# Patient Record
Sex: MALE | Race: WHITE | NOT HISPANIC OR LATINO | URBAN - METROPOLITAN AREA
[De-identification: names, ages, dates, MRNs, and addresses within clinical notes are randomized per-mention and may not be internally consistent; named-entity substitution may affect disease eponyms.]

---

## 2019-02-06 ENCOUNTER — OFFICE VISIT (OUTPATIENT)
Dept: OPHTHALMOLOGY | Facility: CLINIC | Age: 49
End: 2019-02-06
Payer: COMMERCIAL

## 2019-02-06 DIAGNOSIS — H52.7 REFRACTIVE ERROR: ICD-10-CM

## 2019-02-06 DIAGNOSIS — H53.8 BLURRY VISION, BILATERAL: Primary | ICD-10-CM

## 2019-02-06 PROCEDURE — 92004 COMPRE OPH EXAM NEW PT 1/>: CPT | Mod: S$GLB,,, | Performed by: OPHTHALMOLOGY

## 2019-02-06 PROCEDURE — 92004 PR EYE EXAM, NEW PATIENT,COMPREHESV: ICD-10-PCS | Mod: S$GLB,,, | Performed by: OPHTHALMOLOGY

## 2019-02-06 PROCEDURE — 99999 PR PBB SHADOW E&M-NEW PATIENT-LVL III: CPT | Mod: PBBFAC,,, | Performed by: OPHTHALMOLOGY

## 2019-02-06 PROCEDURE — 99999 PR PBB SHADOW E&M-NEW PATIENT-LVL III: ICD-10-PCS | Mod: PBBFAC,,, | Performed by: OPHTHALMOLOGY

## 2019-02-06 PROCEDURE — 92015 DETERMINE REFRACTIVE STATE: CPT | Mod: S$GLB,,, | Performed by: OPHTHALMOLOGY

## 2019-02-06 PROCEDURE — 92015 PR REFRACTION: ICD-10-PCS | Mod: S$GLB,,, | Performed by: OPHTHALMOLOGY

## 2019-02-06 NOTE — PATIENT INSTRUCTIONS
Understanding Focusing Problems  Your vision depends on how light is focused in your eye. An eye has different parts to it, such as the cornea, lens, and iris. These parts work together to refract, or bend, and focus light rays. With normal vision, light is focused on the back of the eyeball. This area is called the retina. But in some cases, all or part of the eye is not the right shape. This causes light to focus in the wrong place. It makes vision blurry. Three common problems with focusing include hyperopia, myopia, and astigmatism.        Emmetropia       Hyperopia      Emmetropia or normal vision happens when light focuses on the retina.  Hyperopia or farsightedness occurs when light focuses behind the retina. Nearby objects look blurry.     Myopia       Astigmatism      Myopia or nearsightedness occurs when light focuses in front of the retina. Distant objects look blurry.  Astigmatism occurs when light focuses in more than one place. Both nearby and distant objects can look blurry.  If youre over 40  With age, the lens becomes stiff. Stiffening of the lens makes it more difficult for the lens to accommodate, or change shape, to focus light. This leads to presbyopia, or trouble focusing on nearby objects.   Date Last Reviewed: 8/16/2015  © 4917-0328 [employer]. [address] [place] [address], [place] [address]. All rights reserved. This information is not intended as a substitute for professional medical care. Always follow your healthcare professional's instructions.

## 2019-02-06 NOTE — PROGRESS NOTES
HPI     no blurred va// no flashes// no floaters//  Does get starbursts with car   lights in the PM//    Last edited by Rowan Li on 2/6/2019  1:09 PM. (History)        ROS     Negative for: Constitutional, Gastrointestinal, Neurological, Skin,   Genitourinary, Musculoskeletal, HENT, Endocrine, Cardiovascular, Eyes,   Respiratory, Psychiatric, Allergic/Imm, Heme/Lymph    Last edited by Skyler Low Jr., MD on 2/6/2019  1:53 PM. (History)        Assessment /Plan     For exam results, see Encounter Report.    Blurry vision, bilateral    Refractive error  Current glasses are scratched up, no acute or chronic ocular pathology  Rx for glasses given  Follow-up in about 1 year (around 2/6/2020) for Annual w/ optometry.

## 2021-03-10 ENCOUNTER — IMMUNIZATION (OUTPATIENT)
Dept: PRIMARY CARE CLINIC | Facility: CLINIC | Age: 51
End: 2021-03-10
Payer: COMMERCIAL

## 2021-03-10 DIAGNOSIS — Z23 NEED FOR VACCINATION: Primary | ICD-10-CM

## 2021-03-10 PROCEDURE — 0031A PR IMMUNIZ ADMIN, SARS-COV-2 COVID-19 VACC, 5X10VP/0.5ML: CPT | Mod: CV19,S$GLB,, | Performed by: INTERNAL MEDICINE

## 2021-03-10 PROCEDURE — 91303 PR SARSCOV2 VAC AD26 .5ML IM: ICD-10-PCS | Mod: S$GLB,,, | Performed by: INTERNAL MEDICINE

## 2021-03-10 PROCEDURE — 0031A PR IMMUNIZ ADMIN, SARS-COV-2 COVID-19 VACC, 5X10VP/0.5ML: ICD-10-PCS | Mod: CV19,S$GLB,, | Performed by: INTERNAL MEDICINE

## 2021-03-10 PROCEDURE — 91303 PR SARSCOV2 VAC AD26 .5ML IM: CPT | Mod: S$GLB,,, | Performed by: INTERNAL MEDICINE

## 2021-12-10 ENCOUNTER — IMMUNIZATION (OUTPATIENT)
Dept: FAMILY MEDICINE | Facility: CLINIC | Age: 51
End: 2021-12-10
Payer: COMMERCIAL

## 2021-12-10 DIAGNOSIS — Z23 NEED FOR VACCINATION: Primary | ICD-10-CM

## 2021-12-10 PROCEDURE — 0004A COVID-19, MRNA, LNP-S, PF, 30 MCG/0.3 ML DOSE VACCINE: CPT | Mod: PBBFAC | Performed by: FAMILY MEDICINE

## 2023-08-08 ENCOUNTER — OFFICE VISIT (OUTPATIENT)
Dept: FAMILY MEDICINE | Facility: CLINIC | Age: 53
End: 2023-08-08
Payer: COMMERCIAL

## 2023-08-08 VITALS
SYSTOLIC BLOOD PRESSURE: 138 MMHG | WEIGHT: 181.19 LBS | HEIGHT: 73 IN | HEART RATE: 64 BPM | OXYGEN SATURATION: 99 % | DIASTOLIC BLOOD PRESSURE: 80 MMHG | BODY MASS INDEX: 24.01 KG/M2

## 2023-08-08 DIAGNOSIS — Z12.11 COLON CANCER SCREENING: ICD-10-CM

## 2023-08-08 DIAGNOSIS — J45.909 EXTRINSIC ASTHMA, UNSPECIFIED ASTHMA SEVERITY, UNSPECIFIED WHETHER COMPLICATED, UNSPECIFIED WHETHER PERSISTENT: ICD-10-CM

## 2023-08-08 DIAGNOSIS — Z00.00 PREVENTATIVE HEALTH CARE: Primary | ICD-10-CM

## 2023-08-08 PROCEDURE — 3008F PR BODY MASS INDEX (BMI) DOCUMENTED: ICD-10-PCS | Mod: CPTII,S$GLB,, | Performed by: FAMILY MEDICINE

## 2023-08-08 PROCEDURE — 3008F BODY MASS INDEX DOCD: CPT | Mod: CPTII,S$GLB,, | Performed by: FAMILY MEDICINE

## 2023-08-08 PROCEDURE — 3075F SYST BP GE 130 - 139MM HG: CPT | Mod: CPTII,S$GLB,, | Performed by: FAMILY MEDICINE

## 2023-08-08 PROCEDURE — 3075F PR MOST RECENT SYSTOLIC BLOOD PRESS GE 130-139MM HG: ICD-10-PCS | Mod: CPTII,S$GLB,, | Performed by: FAMILY MEDICINE

## 2023-08-08 PROCEDURE — 99386 PREV VISIT NEW AGE 40-64: CPT | Mod: S$GLB,,, | Performed by: FAMILY MEDICINE

## 2023-08-08 PROCEDURE — 1159F PR MEDICATION LIST DOCUMENTED IN MEDICAL RECORD: ICD-10-PCS | Mod: CPTII,S$GLB,, | Performed by: FAMILY MEDICINE

## 2023-08-08 PROCEDURE — 3079F PR MOST RECENT DIASTOLIC BLOOD PRESSURE 80-89 MM HG: ICD-10-PCS | Mod: CPTII,S$GLB,, | Performed by: FAMILY MEDICINE

## 2023-08-08 PROCEDURE — 3079F DIAST BP 80-89 MM HG: CPT | Mod: CPTII,S$GLB,, | Performed by: FAMILY MEDICINE

## 2023-08-08 PROCEDURE — 1159F MED LIST DOCD IN RCRD: CPT | Mod: CPTII,S$GLB,, | Performed by: FAMILY MEDICINE

## 2023-08-08 PROCEDURE — 99999 PR PBB SHADOW E&M-EST. PATIENT-LVL III: CPT | Mod: PBBFAC,,, | Performed by: FAMILY MEDICINE

## 2023-08-08 PROCEDURE — 99386 PR PREVENTIVE VISIT,NEW,40-64: ICD-10-PCS | Mod: S$GLB,,, | Performed by: FAMILY MEDICINE

## 2023-08-08 PROCEDURE — 99999 PR PBB SHADOW E&M-EST. PATIENT-LVL III: ICD-10-PCS | Mod: PBBFAC,,, | Performed by: FAMILY MEDICINE

## 2023-08-08 NOTE — PROGRESS NOTES
Subjective:       Patient ID: Javid Tai is a 53 y.o. male.    Chief Complaint: Annual Exam    Here to establish care and for general exam    Palpitations, skipping beats - Previous cardiac exam in 2013 was normal, including holter.  H/o elevated blood pressure - following low-salt diet.    Asthma - C/o some chest tightness at night that are relieved with relaxation techniques. No exercise symptoms. He did not respond to albuterol or inhaled steroids.    Enjoys tennis.    Some intermittent LLQ pains that he thinks may be diverticulitis    Taking calicium + D, magnesium and B12.    He has had covid on 4 occasions, including the Delta variant.        Past Medical History:   Diagnosis Date    Non-celiac gluten sensitivity     Paroxysmal A-fib     primarily with alcohol trigger    Shingles        Past Surgical History:   Procedure Laterality Date    WISDOM TOOTH EXTRACTION         Review of patient's allergies indicates:   Allergen Reactions    Gluten protein Other (See Comments) and Shortness Of Breath       Social History     Socioeconomic History    Marital status:     Number of children: 1   Occupational History    Occupation:    Tobacco Use    Smoking status: Never    Smokeless tobacco: Former   Substance and Sexual Activity    Alcohol use: Yes    Drug use: No   Social History Narrative    Enjoys tennis       No current outpatient medications on file prior to visit.     No current facility-administered medications on file prior to visit.       Family History   Problem Relation Age of Onset    Hypertension Mother     Fibromyalgia Mother     Amblyopia Neg Hx     Blindness Neg Hx     Cancer Neg Hx     Cataracts Neg Hx     Diabetes Neg Hx     Glaucoma Neg Hx     Macular degeneration Neg Hx     Retinal detachment Neg Hx     Strabismus Neg Hx     Stroke Neg Hx     Thyroid disease Neg Hx        Review of Systems   Constitutional:  Negative for appetite change, chills, fever and unexpected weight change.  "  HENT:  Negative for sore throat and trouble swallowing.    Eyes:  Negative for pain and visual disturbance.   Respiratory:  Positive for chest tightness. Negative for cough, shortness of breath and wheezing.    Cardiovascular:  Negative for chest pain, palpitations and leg swelling.   Gastrointestinal:  Negative for abdominal pain, blood in stool, constipation, diarrhea and nausea.   Genitourinary:  Negative for difficulty urinating, dysuria and hematuria.   Musculoskeletal:  Negative for arthralgias, gait problem and neck pain.   Skin:  Negative for rash and wound.   Neurological:  Negative for dizziness, weakness, light-headedness and headaches.   Hematological:  Negative for adenopathy.   Psychiatric/Behavioral:  Negative for dysphoric mood.        Objective:      /80   Pulse 64   Ht 6' 1.2" (1.859 m)   Wt 82.2 kg (181 lb 3.5 oz)   SpO2 99%   BMI 23.78 kg/m²   Physical Exam  Constitutional:       General: He is not in acute distress.     Appearance: He is well-developed.   HENT:      Head: Normocephalic and atraumatic.      Right Ear: External ear normal.      Left Ear: External ear normal.      Mouth/Throat:      Pharynx: Uvula midline. No oropharyngeal exudate.   Eyes:      General: Lids are normal.      Conjunctiva/sclera: Conjunctivae normal.      Pupils: Pupils are equal, round, and reactive to light.   Neck:      Thyroid: No thyroid mass or thyromegaly.      Trachea: Phonation normal.   Cardiovascular:      Rate and Rhythm: Normal rate and regular rhythm.      Heart sounds: Normal heart sounds. No murmur heard.     No friction rub. No gallop.   Pulmonary:      Effort: Pulmonary effort is normal. No respiratory distress.      Breath sounds: Normal breath sounds. No wheezing or rales.   Musculoskeletal:         General: Normal range of motion.      Cervical back: Full passive range of motion without pain, normal range of motion and neck supple.   Lymphadenopathy:      Cervical: No cervical " adenopathy.   Skin:     General: Skin is warm and dry.   Neurological:      Mental Status: He is alert and oriented to person, place, and time.      Cranial Nerves: No cranial nerve deficit.      Coordination: Coordination normal.   Psychiatric:         Speech: Speech normal.         Behavior: Behavior normal.         Thought Content: Thought content normal.         Judgment: Judgment normal.         Assessment:       1. Preventative health care    2. Colon cancer screening    3. Extrinsic asthma, unspecified asthma severity, unspecified whether complicated, unspecified whether persistent        Plan:       Preventative health care  -     Comprehensive Metabolic Panel; Future; Expected date: 08/08/2023  -     CBC Auto Differential; Future; Expected date: 08/08/2023  -     Lipid Panel; Future; Expected date: 08/08/2023  -     PSA, Screening; Future; Expected date: 08/08/2023  -     Hepatitis C Antibody; Future; Expected date: 08/08/2023  -     HIV 1/2 Ag/Ab (4th Gen); Future; Expected date: 08/08/2023    Colon cancer screening  -     Case Request Endoscopy: COLONOSCOPY    Extrinsic asthma, unspecified asthma severity, unspecified whether complicated, unspecified whether persistent  -     Ambulatory referral/consult to Allergy; Future; Expected date: 08/15/2023      Overall appears healthy  Allergist referral to consider allergy testing for possible nightime asthma triggers (dust mites)  Counseled on regular exercise, maintenance of a healthy weight, balanced diet rich in fruits/vegetables and lean protein, and avoidance of unhealthy habits like smoking and excessive alcohol intake.

## 2023-08-09 ENCOUNTER — LAB VISIT (OUTPATIENT)
Dept: LAB | Facility: HOSPITAL | Age: 53
End: 2023-08-09
Attending: FAMILY MEDICINE
Payer: COMMERCIAL

## 2023-08-09 DIAGNOSIS — Z00.00 PREVENTATIVE HEALTH CARE: ICD-10-CM

## 2023-08-09 LAB
ALBUMIN SERPL BCP-MCNC: 4.1 G/DL (ref 3.5–5.2)
ALP SERPL-CCNC: 58 U/L (ref 55–135)
ALT SERPL W/O P-5'-P-CCNC: 32 U/L (ref 10–44)
ANION GAP SERPL CALC-SCNC: 9 MMOL/L (ref 8–16)
AST SERPL-CCNC: 35 U/L (ref 10–40)
BASOPHILS # BLD AUTO: 0.06 K/UL (ref 0–0.2)
BASOPHILS NFR BLD: 1.3 % (ref 0–1.9)
BILIRUB SERPL-MCNC: 0.7 MG/DL (ref 0.1–1)
BUN SERPL-MCNC: 23 MG/DL (ref 6–20)
CALCIUM SERPL-MCNC: 10 MG/DL (ref 8.7–10.5)
CHLORIDE SERPL-SCNC: 102 MMOL/L (ref 95–110)
CHOLEST SERPL-MCNC: 196 MG/DL (ref 120–199)
CHOLEST/HDLC SERPL: 3.2 {RATIO} (ref 2–5)
CO2 SERPL-SCNC: 28 MMOL/L (ref 23–29)
COMPLEXED PSA SERPL-MCNC: 0.69 NG/ML (ref 0–4)
CREAT SERPL-MCNC: 1.2 MG/DL (ref 0.5–1.4)
DIFFERENTIAL METHOD: NORMAL
EOSINOPHIL # BLD AUTO: 0.1 K/UL (ref 0–0.5)
EOSINOPHIL NFR BLD: 2.4 % (ref 0–8)
ERYTHROCYTE [DISTWIDTH] IN BLOOD BY AUTOMATED COUNT: 13.2 % (ref 11.5–14.5)
EST. GFR  (NO RACE VARIABLE): >60 ML/MIN/1.73 M^2
GLUCOSE SERPL-MCNC: 93 MG/DL (ref 70–110)
HCT VFR BLD AUTO: 43.1 % (ref 40–54)
HCV AB SERPL QL IA: NORMAL
HDLC SERPL-MCNC: 62 MG/DL (ref 40–75)
HDLC SERPL: 31.6 % (ref 20–50)
HGB BLD-MCNC: 14.2 G/DL (ref 14–18)
HIV 1+2 AB+HIV1 P24 AG SERPL QL IA: NORMAL
IMM GRANULOCYTES # BLD AUTO: 0.01 K/UL (ref 0–0.04)
IMM GRANULOCYTES NFR BLD AUTO: 0.2 % (ref 0–0.5)
LDLC SERPL CALC-MCNC: 121.6 MG/DL (ref 63–159)
LYMPHOCYTES # BLD AUTO: 1.3 K/UL (ref 1–4.8)
LYMPHOCYTES NFR BLD: 29.2 % (ref 18–48)
MCH RBC QN AUTO: 30.3 PG (ref 27–31)
MCHC RBC AUTO-ENTMCNC: 32.9 G/DL (ref 32–36)
MCV RBC AUTO: 92 FL (ref 82–98)
MONOCYTES # BLD AUTO: 0.4 K/UL (ref 0.3–1)
MONOCYTES NFR BLD: 9.7 % (ref 4–15)
NEUTROPHILS # BLD AUTO: 2.6 K/UL (ref 1.8–7.7)
NEUTROPHILS NFR BLD: 57.2 % (ref 38–73)
NONHDLC SERPL-MCNC: 134 MG/DL
NRBC BLD-RTO: 0 /100 WBC
PLATELET # BLD AUTO: 219 K/UL (ref 150–450)
PMV BLD AUTO: 12 FL (ref 9.2–12.9)
POTASSIUM SERPL-SCNC: 4.7 MMOL/L (ref 3.5–5.1)
PROT SERPL-MCNC: 7.3 G/DL (ref 6–8.4)
RBC # BLD AUTO: 4.69 M/UL (ref 4.6–6.2)
SODIUM SERPL-SCNC: 139 MMOL/L (ref 136–145)
TRIGL SERPL-MCNC: 62 MG/DL (ref 30–150)
WBC # BLD AUTO: 4.52 K/UL (ref 3.9–12.7)

## 2023-08-09 PROCEDURE — 80061 LIPID PANEL: CPT | Performed by: FAMILY MEDICINE

## 2023-08-09 PROCEDURE — 87389 HIV-1 AG W/HIV-1&-2 AB AG IA: CPT | Performed by: FAMILY MEDICINE

## 2023-08-09 PROCEDURE — 84153 ASSAY OF PSA TOTAL: CPT | Performed by: FAMILY MEDICINE

## 2023-08-09 PROCEDURE — 86803 HEPATITIS C AB TEST: CPT | Performed by: FAMILY MEDICINE

## 2023-08-09 PROCEDURE — 36415 COLL VENOUS BLD VENIPUNCTURE: CPT | Mod: PO | Performed by: FAMILY MEDICINE

## 2023-08-09 PROCEDURE — 80053 COMPREHEN METABOLIC PANEL: CPT | Performed by: FAMILY MEDICINE

## 2023-08-09 PROCEDURE — 85025 COMPLETE CBC W/AUTO DIFF WBC: CPT | Performed by: FAMILY MEDICINE

## 2023-09-01 ENCOUNTER — TELEPHONE (OUTPATIENT)
Dept: GASTROENTEROLOGY | Facility: CLINIC | Age: 53
End: 2023-09-01
Payer: COMMERCIAL

## 2023-09-27 ENCOUNTER — OFFICE VISIT (OUTPATIENT)
Dept: ALLERGY | Facility: CLINIC | Age: 53
End: 2023-09-27
Payer: COMMERCIAL

## 2023-09-27 VITALS
HEIGHT: 72 IN | WEIGHT: 181.19 LBS | DIASTOLIC BLOOD PRESSURE: 96 MMHG | BODY MASS INDEX: 24.54 KG/M2 | SYSTOLIC BLOOD PRESSURE: 150 MMHG | OXYGEN SATURATION: 99 % | HEART RATE: 64 BPM

## 2023-09-27 DIAGNOSIS — R06.02 SHORTNESS OF BREATH: ICD-10-CM

## 2023-09-27 DIAGNOSIS — J31.0 CHRONIC RHINITIS: Primary | ICD-10-CM

## 2023-09-27 DIAGNOSIS — J45.20 MILD INTERMITTENT ASTHMA WITHOUT COMPLICATION: ICD-10-CM

## 2023-09-27 PROCEDURE — 3008F BODY MASS INDEX DOCD: CPT | Mod: CPTII,S$GLB,, | Performed by: ALLERGY & IMMUNOLOGY

## 2023-09-27 PROCEDURE — 1160F PR REVIEW ALL MEDS BY PRESCRIBER/CLIN PHARMACIST DOCUMENTED: ICD-10-PCS | Mod: CPTII,S$GLB,, | Performed by: ALLERGY & IMMUNOLOGY

## 2023-09-27 PROCEDURE — 99204 PR OFFICE/OUTPT VISIT, NEW, LEVL IV, 45-59 MIN: ICD-10-PCS | Mod: S$GLB,,, | Performed by: ALLERGY & IMMUNOLOGY

## 2023-09-27 PROCEDURE — 1159F PR MEDICATION LIST DOCUMENTED IN MEDICAL RECORD: ICD-10-PCS | Mod: CPTII,S$GLB,, | Performed by: ALLERGY & IMMUNOLOGY

## 2023-09-27 PROCEDURE — 3008F PR BODY MASS INDEX (BMI) DOCUMENTED: ICD-10-PCS | Mod: CPTII,S$GLB,, | Performed by: ALLERGY & IMMUNOLOGY

## 2023-09-27 PROCEDURE — 99204 OFFICE O/P NEW MOD 45 MIN: CPT | Mod: S$GLB,,, | Performed by: ALLERGY & IMMUNOLOGY

## 2023-09-27 PROCEDURE — 1159F MED LIST DOCD IN RCRD: CPT | Mod: CPTII,S$GLB,, | Performed by: ALLERGY & IMMUNOLOGY

## 2023-09-27 PROCEDURE — 1160F RVW MEDS BY RX/DR IN RCRD: CPT | Mod: CPTII,S$GLB,, | Performed by: ALLERGY & IMMUNOLOGY

## 2023-09-27 PROCEDURE — 3080F PR MOST RECENT DIASTOLIC BLOOD PRESSURE >= 90 MM HG: ICD-10-PCS | Mod: CPTII,S$GLB,, | Performed by: ALLERGY & IMMUNOLOGY

## 2023-09-27 PROCEDURE — 3080F DIAST BP >= 90 MM HG: CPT | Mod: CPTII,S$GLB,, | Performed by: ALLERGY & IMMUNOLOGY

## 2023-09-27 PROCEDURE — 99999 PR PBB SHADOW E&M-EST. PATIENT-LVL III: CPT | Mod: PBBFAC,,, | Performed by: ALLERGY & IMMUNOLOGY

## 2023-09-27 PROCEDURE — 3077F PR MOST RECENT SYSTOLIC BLOOD PRESSURE >= 140 MM HG: ICD-10-PCS | Mod: CPTII,S$GLB,, | Performed by: ALLERGY & IMMUNOLOGY

## 2023-09-27 PROCEDURE — 3077F SYST BP >= 140 MM HG: CPT | Mod: CPTII,S$GLB,, | Performed by: ALLERGY & IMMUNOLOGY

## 2023-09-27 PROCEDURE — 99999 PR PBB SHADOW E&M-EST. PATIENT-LVL III: ICD-10-PCS | Mod: PBBFAC,,, | Performed by: ALLERGY & IMMUNOLOGY

## 2023-09-27 NOTE — PROGRESS NOTES
Subjective:       Patient ID: Javid Tai is a 53 y.o. male.    Chief Complaint:  Asthma      54 yo man presents for consult from Dr Eb Obrien for SOB. He states symptom started 18 years ago. He gets SOB. Worse at night, at times would feel like fish out of water. When it started was more severe. Was started on albuterol, ICS and made him worse then higher doses and felt even worse. He connected symptoms to gluten so went gluten free and symptoms improved. For few years rare issue then started to come back. Seems to be stress related. When on vacation moslty resolves. Relaxation techniques make him better. If he exercises prior to bed then less likely to have issues. No cough, no wheeze. At times had nasal congestion but never with SOB. When occurs has to force himself to breathe or yawn. No specific triggers. Is worse in May and June so thinks grass pollen Is trigger. No insect or latex allergy. No other medical issues        Environmental History: see history section for home environment  Review of Systems   HENT:  Positive for congestion. Negative for postnasal drip, rhinorrhea and sneezing.    Eyes:  Negative for discharge and itching.   Respiratory:  Positive for chest tightness and shortness of breath. Negative for cough and wheezing.         Objective:      Physical Exam  Vitals and nursing note reviewed.   Constitutional:       General: He is not in acute distress.     Appearance: Normal appearance. He is not ill-appearing.   HENT:      Nose: No rhinorrhea.   Eyes:      General:         Right eye: No discharge.         Left eye: No discharge.      Conjunctiva/sclera: Conjunctivae normal.   Pulmonary:      Effort: Pulmonary effort is normal. No respiratory distress.   Abdominal:      General: There is no distension.   Skin:     General: Skin is warm and dry.      Findings: No erythema or rash.   Neurological:      Mental Status: He is alert and oriented to person, place, and time.   Psychiatric:          Mood and Affect: Mood normal.         Behavior: Behavior normal.         Laboratory:   none performed   Assessment:       1. Chronic rhinitis    2. Shortness of breath    3. Mild intermittent asthma without complication         Plan:       Advised pt symptoms are C/e RAD so will send immunocaps to see if any allergic triggers. May need to check PFT in future  Can trial albuterol but pt prefers to hold off  Phone review  Dr Obrien notified of completed consult via Epic    I spent a total of 45 minutes on the day of the visit.  This includes face to face time and non-face to face time preparing to see the patient (eg, review of tests), obtaining and/or reviewing separately obtained history, documenting clinical information in the electronic or other health record, independently interpreting results and communicating results to the patient/family/caregiver, or care coordinator.

## 2023-10-04 ENCOUNTER — LAB VISIT (OUTPATIENT)
Dept: LAB | Facility: HOSPITAL | Age: 53
End: 2023-10-04
Attending: FAMILY MEDICINE
Payer: COMMERCIAL

## 2023-10-04 DIAGNOSIS — R06.02 SHORTNESS OF BREATH: ICD-10-CM

## 2023-10-04 DIAGNOSIS — J31.0 CHRONIC RHINITIS: ICD-10-CM

## 2023-10-04 DIAGNOSIS — J45.20 MILD INTERMITTENT ASTHMA WITHOUT COMPLICATION: ICD-10-CM

## 2023-10-04 PROCEDURE — 36415 COLL VENOUS BLD VENIPUNCTURE: CPT | Mod: PO | Performed by: ALLERGY & IMMUNOLOGY

## 2023-10-04 PROCEDURE — 86003 ALLG SPEC IGE CRUDE XTRC EA: CPT | Mod: 59 | Performed by: ALLERGY & IMMUNOLOGY

## 2023-10-04 PROCEDURE — 86003 ALLG SPEC IGE CRUDE XTRC EA: CPT | Performed by: ALLERGY & IMMUNOLOGY

## 2023-10-11 LAB
A ALTERNATA IGE QN: <0.1 KU/L
A FUMIGATUS IGE QN: <0.1 KU/L
ALLERGEN CHAETOMIUM GLOBOSUM IGE: <0.1 KU/L
ALLERGEN WALNUT TREE IGE: <0.1 KU/L
ALLERGEN WHITE PINE TREE IGE: <0.1 KU/L
ALMOND IGE QN: <0.1 KU/L
BAHIA GRASS IGE QN: <0.1 KU/L
BALD CYPRESS IGE QN: <0.1 KU/L
BERMUDA GRASS IGE QN: <0.1 KU/L
C HERBARUM IGE QN: <0.1 KU/L
C LUNATA IGE QN: <0.1 KU/L
CAT DANDER IGE QN: <0.1 KU/L
CHAETOMIUM GLOB. CLASS: NORMAL
COMMON RAGWEED IGE QN: <0.1 KU/L
COTTONWOOD IGE QN: <0.1 KU/L
COW MILK IGE QN: <0.1 KU/L
D FARINAE IGE QN: <0.1 KU/L
D PTERONYSS IGE QN: <0.1 KU/L
DEPRECATED A ALTERNATA IGE RAST QL: NORMAL
DEPRECATED A FUMIGATUS IGE RAST QL: NORMAL
DEPRECATED ALMOND IGE RAST QL: NORMAL
DEPRECATED BAHIA GRASS IGE RAST QL: NORMAL
DEPRECATED BALD CYPRESS IGE RAST QL: NORMAL
DEPRECATED BERMUDA GRASS IGE RAST QL: NORMAL
DEPRECATED C HERBARUM IGE RAST QL: NORMAL
DEPRECATED C LUNATA IGE RAST QL: NORMAL
DEPRECATED CAT DANDER IGE RAST QL: NORMAL
DEPRECATED COMMON RAGWEED IGE RAST QL: NORMAL
DEPRECATED COTTONWOOD IGE RAST QL: NORMAL
DEPRECATED COW MILK IGE RAST QL: NORMAL
DEPRECATED D FARINAE IGE RAST QL: NORMAL
DEPRECATED D PTERONYSS IGE RAST QL: NORMAL
DEPRECATED DOG DANDER IGE RAST QL: NORMAL
DEPRECATED EGG WHITE IGE RAST QL: NORMAL
DEPRECATED ELDER IGE RAST QL: NORMAL
DEPRECATED ENGL PLANTAIN IGE RAST QL: NORMAL
DEPRECATED JOHNSON GRASS IGE RAST QL: NORMAL
DEPRECATED LONDON PLANE IGE RAST QL: NORMAL
DEPRECATED MUGWORT IGE RAST QL: NORMAL
DEPRECATED OAT IGE RAST QL: NORMAL
DEPRECATED P NOTATUM IGE RAST QL: NORMAL
DEPRECATED PEANUT IGE RAST QL: NORMAL
DEPRECATED PECAN/HICK TREE IGE RAST QL: NORMAL
DEPRECATED ROACH IGE RAST QL: NORMAL
DEPRECATED S ROSTRATA IGE RAST QL: NORMAL
DEPRECATED SALTWORT IGE RAST QL: NORMAL
DEPRECATED SILVER BIRCH IGE RAST QL: NORMAL
DEPRECATED SOYBEAN IGE RAST QL: NORMAL
DEPRECATED TIMOTHY IGE RAST QL: NORMAL
DEPRECATED WHEAT IGE RAST QL: NORMAL
DEPRECATED WHITE OAK IGE RAST QL: NORMAL
DEPRECATED WILLOW IGE RAST QL: NORMAL
DOG DANDER IGE QN: <0.1 KU/L
EGG WHITE IGE QN: <0.1 KU/L
ELDER IGE QN: <0.1 KU/L
ENGL PLANTAIN IGE QN: <0.1 KU/L
JOHNSON GRASS IGE QN: <0.1 KU/L
LONDON PLANE IGE QN: <0.1 KU/L
MUGWORT IGE QN: <0.1 KU/L
OAT IGE QN: <0.1 KU/L
P NOTATUM IGE QN: <0.1 KU/L
PEANUT IGE QN: <0.1 KU/L
PECAN/HICK TREE IGE QN: <0.1 KU/L
ROACH IGE QN: <0.1 KU/L
S ROSTRATA IGE QN: <0.1 KU/L
SALTWORT IGE QN: <0.1 KU/L
SILVER BIRCH IGE QN: <0.1 KU/L
SOYBEAN IGE QN: <0.1 KU/L
TIMOTHY IGE QN: <0.1 KU/L
WALNUT TREE CLASS: NORMAL
WHEAT IGE QN: <0.1 KU/L
WHITE OAK IGE QN: <0.1 KU/L
WHITE PINE CLASS: NORMAL
WILLOW IGE QN: <0.1 KU/L

## 2023-10-12 ENCOUNTER — PATIENT MESSAGE (OUTPATIENT)
Dept: ALLERGY | Facility: CLINIC | Age: 53
End: 2023-10-12
Payer: COMMERCIAL

## 2023-10-12 DIAGNOSIS — R06.02 SHORTNESS OF BREATH: Primary | ICD-10-CM

## 2023-10-12 LAB
DEPRECATED WEST RAGWEED IGE RAST QL: NORMAL
WEST RAGWEED IGE QN: <0.1 KU/L

## 2023-10-17 ENCOUNTER — TELEPHONE (OUTPATIENT)
Dept: GASTROENTEROLOGY | Facility: CLINIC | Age: 53
End: 2023-10-17
Payer: COMMERCIAL

## 2023-10-17 NOTE — TELEPHONE ENCOUNTER
Second attempt to schedule C-scope. Pt doesn't answer phone call nor answer [employer] msg. I leave pt a brief vm to call back to schedule C-scope. Cancellation letter is sent to pt's current address. Case is closed. PCP is notified.

## 2024-01-08 ENCOUNTER — OFFICE VISIT (OUTPATIENT)
Dept: CARDIOLOGY | Facility: CLINIC | Age: 54
End: 2024-01-08
Payer: COMMERCIAL

## 2024-01-08 VITALS
HEIGHT: 73 IN | WEIGHT: 186.5 LBS | SYSTOLIC BLOOD PRESSURE: 158 MMHG | BODY MASS INDEX: 24.72 KG/M2 | DIASTOLIC BLOOD PRESSURE: 96 MMHG | HEART RATE: 74 BPM

## 2024-01-08 DIAGNOSIS — I10 PRIMARY HYPERTENSION: ICD-10-CM

## 2024-01-08 DIAGNOSIS — I48.0 PAF (PAROXYSMAL ATRIAL FIBRILLATION): Primary | ICD-10-CM

## 2024-01-08 PROCEDURE — 3008F BODY MASS INDEX DOCD: CPT | Mod: CPTII,S$GLB,, | Performed by: INTERNAL MEDICINE

## 2024-01-08 PROCEDURE — 99204 OFFICE O/P NEW MOD 45 MIN: CPT | Mod: S$GLB,,, | Performed by: INTERNAL MEDICINE

## 2024-01-08 PROCEDURE — 99999 PR PBB SHADOW E&M-EST. PATIENT-LVL III: CPT | Mod: PBBFAC,,, | Performed by: INTERNAL MEDICINE

## 2024-01-08 PROCEDURE — 93010 ELECTROCARDIOGRAM REPORT: CPT | Mod: S$GLB,,, | Performed by: INTERNAL MEDICINE

## 2024-01-08 PROCEDURE — 1160F RVW MEDS BY RX/DR IN RCRD: CPT | Mod: CPTII,S$GLB,, | Performed by: INTERNAL MEDICINE

## 2024-01-08 PROCEDURE — 1159F MED LIST DOCD IN RCRD: CPT | Mod: CPTII,S$GLB,, | Performed by: INTERNAL MEDICINE

## 2024-01-08 PROCEDURE — 3077F SYST BP >= 140 MM HG: CPT | Mod: CPTII,S$GLB,, | Performed by: INTERNAL MEDICINE

## 2024-01-08 PROCEDURE — 3080F DIAST BP >= 90 MM HG: CPT | Mod: CPTII,S$GLB,, | Performed by: INTERNAL MEDICINE

## 2024-01-08 PROCEDURE — 93005 ELECTROCARDIOGRAM TRACING: CPT | Mod: PO

## 2024-01-08 NOTE — PROGRESS NOTES
Subjective:    Patient ID:  Javid Tai is a 53 y.o. male patient here for evaluation Follow-up      History of Present Illness:  New patient cardiac evaluation.  Prior history of paroxysmal atrial fibrillation.  Last clinical episode least 4 years ago.  Patient on no medications.  No major risk factors, denies diabetes mellitus, hypertension, dyslipidemia tobacco use.  No early family history.    Baseline EKG today unremarkable except unifocal PVC.  Patient is symptomatic with PVCs.  Most noticeable in the post exercise period, no major exertional symptoms.  Denies exertional chest pain dyspnea.  No PND orthopnea.  No edema.  No past documented structural ischemic or arrhythmic heart disease.  Recent labs unremarkable.    In the clinic moderately elevated, repeat blood pressure by me 142 over 84, pulse in the 70s.             Review of patient's allergies indicates:   Allergen Reactions    Gluten protein Other (See Comments) and Shortness Of Breath       Past Medical History:   Diagnosis Date    Non-celiac gluten sensitivity     Paroxysmal A-fib     primarily with alcohol trigger    Shingles      Past Surgical History:   Procedure Laterality Date    WISDOM TOOTH EXTRACTION       Social History     Tobacco Use    Smoking status: Never    Smokeless tobacco: Former   Substance Use Topics    Alcohol use: Yes    Drug use: No        Review of Systems:    As noted in HPI in addition         REVIEW OF SYSTEMS  Review of Systems   Constitutional: Negative for decreased appetite, diaphoresis, night sweats, weight gain and weight loss.   HENT:  Negative for nosebleeds and odynophagia.    Eyes:  Negative for double vision and photophobia.   Cardiovascular:  Negative for chest pain, claudication, cyanosis, dyspnea on exertion, irregular heartbeat, leg swelling, near-syncope, orthopnea, palpitations, paroxysmal nocturnal dyspnea and syncope.   Respiratory:  Negative for cough, hemoptysis, shortness of breath and wheezing.     Hematologic/Lymphatic: Negative for adenopathy.   Skin:  Negative for flushing, skin cancer and suspicious lesions.   Musculoskeletal:  Negative for gout, myalgias and neck pain.   Gastrointestinal:  Negative for abdominal pain, heartburn, hematemesis and hematochezia.   Genitourinary:  Negative for bladder incontinence, hesitancy and nocturia.   Neurological:  Negative for focal weakness, headaches, light-headedness and paresthesias.   Psychiatric/Behavioral:  Negative for memory loss and substance abuse.        Objective:        Vitals:    01/08/24 0931   BP: (!) 158/96   Pulse: 74       Lab Results   Component Value Date    WBC 4.52 08/09/2023    HGB 14.2 08/09/2023    HCT 43.1 08/09/2023     08/09/2023    CHOL 196 08/09/2023    TRIG 62 08/09/2023    HDL 62 08/09/2023    ALT 32 08/09/2023    AST 35 08/09/2023     08/09/2023    K 4.7 08/09/2023     08/09/2023    CREATININE 1.2 08/09/2023    BUN 23 (H) 08/09/2023    CO2 28 08/09/2023    PSA 0.69 08/09/2023      CARDIOGRAM RESULTS  No results found for this or any previous visit.        CURRENT/PREVIOUS VISIT EKG  No results found for this or any previous visit.  No valid procedures specified.   No results found for this or any previous visit.    No valid procedures specified.    PHYSICAL EXAM  CONSTITUTIONAL: Well built, well nourished in no apparent distress  NECK: no carotid bruit, no JVD  LUNGS: CTA  CHEST WALL: no tenderness,  HEART: regular rate and rhythm, S1, S2 normal, no murmur, click, rub or gallop   ABDOMEN: soft, non-tender; bowel sounds normal; no masses,  no organomegaly  EXTREMITIES: Extremities normal, no edema, no calf tenderness noted  VASCULAR EXAM: 2 PLUS UPPER AND LOWER EXT PULSES  NEURO: AAO X 3, NO ACUTE FOCAL OR LATERALIZING FINDINGS    I HAVE REVIEWED :    The vital signs, nurses notes, and all the pertinent radiology and labs.         No current outpatient medications       Assessment:   Remote history of paroxysmal  atrial fibrillation, corrected with diet and lifestyle modification.  Last clinical episode about 4 years ago.  Baseline EKG today with normal sinus rhythm.  One PVC recorded otherwise normal.    Symptomatic PVCs in the post exercise.        Plan:   Suggest Holter monitor and stress echo.          No follow-ups on file.

## 2024-01-09 ENCOUNTER — CLINICAL SUPPORT (OUTPATIENT)
Dept: CARDIOLOGY | Facility: HOSPITAL | Age: 54
End: 2024-01-09
Attending: INTERNAL MEDICINE
Payer: COMMERCIAL

## 2024-01-09 VITALS
BODY MASS INDEX: 24.65 KG/M2 | WEIGHT: 186 LBS | SYSTOLIC BLOOD PRESSURE: 148 MMHG | HEIGHT: 73 IN | DIASTOLIC BLOOD PRESSURE: 96 MMHG

## 2024-01-09 DIAGNOSIS — I10 PRIMARY HYPERTENSION: ICD-10-CM

## 2024-01-09 DIAGNOSIS — I48.0 PAF (PAROXYSMAL ATRIAL FIBRILLATION): ICD-10-CM

## 2024-01-09 PROCEDURE — 93351 STRESS TTE COMPLETE: CPT | Mod: 26,,, | Performed by: INTERNAL MEDICINE

## 2024-01-09 PROCEDURE — 93351 STRESS TTE COMPLETE: CPT | Mod: PO

## 2024-01-10 ENCOUNTER — TELEPHONE (OUTPATIENT)
Dept: FAMILY MEDICINE | Facility: CLINIC | Age: 54
End: 2024-01-10
Payer: COMMERCIAL

## 2024-01-10 LAB
ASCENDING AORTA: 3.14 CM
BSA FOR ECHO PROCEDURE: 2.08 M2
CV ECHO LV RWT: 0.4 CM
CV STRESS BASE HR: 92 BPM
DIASTOLIC BLOOD PRESSURE: 96 MMHG
DOP CALC LVOT AREA: 4.6 CM2
DOP CALC LVOT DIAMETER: 2.42 CM
DOP CALC LVOT PEAK VEL: 1.11 M/S
DOP CALC LVOT STROKE VOLUME: 111.25 CM3
DOP CALCLVOT PEAK VEL VTI: 24.2 CM
E WAVE DECELERATION TIME: 193.52 MSEC
E/A RATIO: 1
E/E' RATIO: 4.3 M/S
ECHO LV POSTERIOR WALL: 1 CM (ref 0.6–1.1)
FRACTIONAL SHORTENING: 36 % (ref 28–44)
INTERVENTRICULAR SEPTUM: 0.95 CM (ref 0.6–1.1)
LEFT ATRIUM SIZE: 3.14 CM
LEFT ATRIUM VOLUME INDEX MOD: 23.5 ML/M2
LEFT ATRIUM VOLUME MOD: 49.19 CM3
LEFT INTERNAL DIMENSION IN SYSTOLE: 3.18 CM (ref 2.1–4)
LEFT VENTRICLE DIASTOLIC VOLUME INDEX: 55.6 ML/M2
LEFT VENTRICLE DIASTOLIC VOLUME: 116.2 ML
LEFT VENTRICLE MASS INDEX: 83 G/M2
LEFT VENTRICLE SYSTOLIC VOLUME INDEX: 19.3 ML/M2
LEFT VENTRICLE SYSTOLIC VOLUME: 40.31 ML
LEFT VENTRICULAR INTERNAL DIMENSION IN DIASTOLE: 4.96 CM (ref 3.5–6)
LEFT VENTRICULAR MASS: 173.59 G
LV LATERAL E/E' RATIO: 3.22 M/S
LV SEPTAL E/E' RATIO: 6.44 M/S
LVOT MG: 2.28 MMHG
LVOT MV: 0.69 CM/S
MV PEAK A VEL: 0.58 M/S
MV PEAK E VEL: 0.58 M/S
OHS CV CPX 1 MINUTE RECOVERY HEART RATE: 126 BPM
OHS CV CPX 85 PERCENT MAX PREDICTED HEART RATE MALE: 144
OHS CV CPX ESTIMATED METS: 19
OHS CV CPX MAX PREDICTED HEART RATE: 169
OHS CV CPX PATIENT IS FEMALE: 0
OHS CV CPX PATIENT IS MALE: 1
OHS CV CPX PEAK DIASTOLIC BLOOD PRESSURE: 65 MMHG
OHS CV CPX PEAK HEAR RATE: 164 BPM
OHS CV CPX PEAK RATE PRESSURE PRODUCT: NORMAL
OHS CV CPX PEAK SYSTOLIC BLOOD PRESSURE: 234 MMHG
OHS CV CPX PERCENT MAX PREDICTED HEART RATE ACHIEVED: 97
OHS CV CPX RATE PRESSURE PRODUCT PRESENTING: NORMAL
PULM VEIN S/D RATIO: 1.38
PV PEAK D VEL: 0.42 M/S
PV PEAK S VEL: 0.58 M/S
SINUS: 3.59 CM
STJ: 3.2 CM
STRESS ECHO POST EXERCISE DUR MIN: 10 MINUTES
STRESS ECHO POST EXERCISE DUR SEC: 30 SECONDS
SYSTOLIC BLOOD PRESSURE: 148 MMHG
TDI LATERAL: 0.18 M/S
TDI SEPTAL: 0.09 M/S
TDI: 0.14 M/S
Z-SCORE OF LEFT VENTRICULAR DIMENSION IN END DIASTOLE: -2.62
Z-SCORE OF LEFT VENTRICULAR DIMENSION IN END SYSTOLE: -1.7

## 2024-01-10 NOTE — TELEPHONE ENCOUNTER
Left message on both recorders for patient to call and reschedule his appointment with Dr. Obrien on 7/25/24.

## 2024-01-12 ENCOUNTER — CLINICAL SUPPORT (OUTPATIENT)
Dept: CARDIOLOGY | Facility: HOSPITAL | Age: 54
End: 2024-01-12
Attending: INTERNAL MEDICINE
Payer: COMMERCIAL

## 2024-01-12 DIAGNOSIS — I10 PRIMARY HYPERTENSION: ICD-10-CM

## 2024-01-12 DIAGNOSIS — I48.0 PAF (PAROXYSMAL ATRIAL FIBRILLATION): ICD-10-CM

## 2024-01-12 PROCEDURE — 93243 EXT ECG>48HR<7D SCAN A/R: CPT | Mod: PO

## 2024-01-12 PROCEDURE — 93244 EXT ECG>48HR<7D REV&INTERPJ: CPT | Mod: ,,, | Performed by: INTERNAL MEDICINE

## 2024-01-18 ENCOUNTER — OFFICE VISIT (OUTPATIENT)
Dept: OPTOMETRY | Facility: CLINIC | Age: 54
End: 2024-01-18
Payer: COMMERCIAL

## 2024-01-18 DIAGNOSIS — Z01.00 EXAMINATION OF EYES AND VISION: Primary | ICD-10-CM

## 2024-01-18 DIAGNOSIS — Z13.5 GLAUCOMA SCREENING: ICD-10-CM

## 2024-01-18 DIAGNOSIS — H43.393 VITREOUS FLOATERS, BILATERAL: ICD-10-CM

## 2024-01-18 DIAGNOSIS — Z46.0 CONTACT LENS/GLASSES FITTING: ICD-10-CM

## 2024-01-18 DIAGNOSIS — H52.203 MYOPIA OF BOTH EYES WITH ASTIGMATISM AND PRESBYOPIA: ICD-10-CM

## 2024-01-18 DIAGNOSIS — H52.13 MYOPIA OF BOTH EYES WITH ASTIGMATISM AND PRESBYOPIA: ICD-10-CM

## 2024-01-18 DIAGNOSIS — H52.4 MYOPIA OF BOTH EYES WITH ASTIGMATISM AND PRESBYOPIA: ICD-10-CM

## 2024-01-18 DIAGNOSIS — H04.123 DRY EYES, BILATERAL: ICD-10-CM

## 2024-01-18 PROBLEM — Z97.3 WEARS CONTACT LENSES: Status: ACTIVE | Noted: 2024-01-18

## 2024-01-18 PROCEDURE — 92015 DETERMINE REFRACTIVE STATE: CPT | Mod: ,,, | Performed by: OPTOMETRIST

## 2024-01-18 PROCEDURE — 92310 CONTACT LENS FITTING OU: CPT | Mod: CSM,S$GLB,, | Performed by: OPTOMETRIST

## 2024-01-18 PROCEDURE — 99999 PR PBB SHADOW E&M-EST. PATIENT-LVL II: CPT | Mod: PBBFAC,,, | Performed by: OPTOMETRIST

## 2024-01-18 PROCEDURE — 92004 COMPRE OPH EXAM NEW PT 1/>: CPT | Mod: ,,, | Performed by: OPTOMETRIST

## 2024-01-18 NOTE — PROGRESS NOTES
HPI    Referral - pt here for routine exam and contact fitting (previous wearer)   DILMA 02/06/2019      Pt complains of star burst in vision, pt stated bp was elevated when star   bursts occurred in vision. BP controlled w aid. Pt not using eye drops. Pt   denies flashes and floaters. Pt not using eye drops. Pt wants to be fitted   for contacts for sport/recreational use.   Last edited by Sandra Aggarwal on 1/18/2024 10:07 AM.            Assessment /Plan     For exam results, see Encounter Report.    Examination of eyes and vision    Vitreous floaters, bilateral    Dry eyes, bilateral    Glaucoma screening    Myopia of both eyes with astigmatism and presbyopia    Contact lens/glasses fitting      Ocular health exam OU   Moderate - high myopia RD precautions given and reviewed. Patient knows to call/ message if any further changes in symptoms occur.  Moderate dry eye complaint, did stop wearing cl's due to vision / comfort in the past ----ATs otc or rewetting gtts when begin w/ cl's   Not suspect   Updated specs rx, gave copy for distance only   Updated / dispense new trials // order daily trials --- may have issues with 060 axis in OD     DAILY WEAR CONTACT LENSES  Continue with Daily Wear of contact lenses, up to all waking hours.  Continue with approved contact lens disinfection / rewetting drops as discussed.  Dispose of lenses daily.   Stop wearing your lenses and call our office if redness, blurred vision, or pain persists more than 12 hours.  We recommend an annual eye exam for contact lens patients.    Discussed and educated patient on current findings /plan.  RTC 1 year, prn if any changes / issues

## 2024-01-18 NOTE — PATIENT INSTRUCTIONS
"DRY EYES -- BURNING OR MAURA SYMPTOMS:  Use Over The Counter artificial tears as needed for dry eye symptoms.   Some common brands include:  Systane, Optive, Refresh, and Thera-Tears.  These drops can be used as frequently as desired, but may be most helpful use during long periods of concentrated work.  For example, reading / working at the computer. Start with 3-4x per day.     Nighttime Ophthalmic gel or ointments are available: Refresh PM, Genteal, and Lacrilube.    Avoid drops that "get redness out" (Visine, Murine, Clear Eyes), as these may contain medication that could further irritate the eyes, especially with chronic use.    ALLERGY EYES -- ITCHING SYMPTOMS:  Over the counter medications include--Pataday, Zaditor, and Alaway.  Use as directed 1-2 drops daily for symptoms of itching / watering eyes.  These drops will not help for dry eye or exposure symptoms.    REDNESS RELIEF:  Lumify---is a good redness reliever that will not cause irritation if used chronically.       FLASHES / FLOATERS / POSTERIOR VITREOUS DETACHMENT    Call the clinic if you have any further changes in symptoms.  Including:  Increased numbers of floaters or flashing lights, dimness or darkness that moves through or stays constant in your vision, or any pain in the eye (s).    You may sometimes see small specks or clouds moving in your field of vision.  They are called FLOATERS.  You can often see them when looking at a plain background, like a blank wall or blue reese.  Floaters are actually tiny clumps of gel or cells inside the VITREOUS, the clear jelly-like fluid that fills the inside of your eye.    While these objects look like they are in front of your eye, they are actually floating inside.  What you see are the shadows they cast on the RETINA, the nerve layer at the back of the eye that senses light and allows you to see.      POSTERIOR VITREOUS DETACHMENT    The appearance of new floaters may be alarming.  If you suddenly develop " new floaters, you should contact your eye care professional  right away.    The retina can tear if the shrinking vitreous pulls away from the wall of the eye.  This sometimes causes a small amount of bleeding in the eye that may appear as new floaters.    A torn retina is always a serious problem, since it can lead to a retinal detachment.  You should see your eye care professional as soon as possible if:    even one new floater appears suddenly;  you see sudden flashes of light;  you notice other symptoms, like the loss of side vision, or a curtain closes down in your vision        POSTERIOR VITREOUS DETACHMENT is more common for people who:    are nearsighted;  have had cataract surgery;  have had YAG laser surgery of the eye;  have had inflammation inside the eye;  are over age 60.      While some floaters may remain visible, many of them will fade over time and become less noticeable.  Even if you've had some floaters for years, you should have your eyes checked as soon as possible if you notice new ones.    FLASHING LIGHTS    When the vitreous gel rubs or pulls on the retina, you may see what look like flashing lights or lightning streaks.  These flashes can appear off and on for several weeks or months.      Some people experience flashes of light that appear as jagged lines or heat waves in both eyes, lasting 10-20 minutes.  These flashes are caused by a spasm of blood vessels in the brain, which is called a migraine.    If a headache follows these flashes, it's called a migraine headache.  If   no headache occurs, these flashes are called Ophthalmic or Ocular Migraine.          DAILY WEAR CONTACT LENSES  Continue with Daily Wear of contact lenses, up to all waking hours.  Continue with approved contact lens disinfection / rewetting drops as discussed.  Dispose of lenses daily  Stop wearing your lenses and call our office if redness, blurred vision, or pain persists more than 12 hours.  We recommend an  annual eye exam for contact lens patients.

## 2024-01-22 LAB
OHS CV EVENT MONITOR DAY: 2
OHS CV HOLTER LENGTH DECIMAL HOURS: 60
OHS CV HOLTER LENGTH HOURS: 12
OHS CV HOLTER LENGTH MINUTES: 0
OHS CV HOLTER SINUS AVERAGE HR: 70
OHS CV HOLTER SINUS MAX HR: 160
OHS CV HOLTER SINUS MIN HR: 37

## 2024-02-27 ENCOUNTER — OFFICE VISIT (OUTPATIENT)
Dept: CARDIOLOGY | Facility: CLINIC | Age: 54
End: 2024-02-27
Attending: INTERNAL MEDICINE
Payer: COMMERCIAL

## 2024-02-27 VITALS
DIASTOLIC BLOOD PRESSURE: 98 MMHG | WEIGHT: 184.75 LBS | HEART RATE: 57 BPM | SYSTOLIC BLOOD PRESSURE: 170 MMHG | BODY MASS INDEX: 24.49 KG/M2 | HEIGHT: 73 IN

## 2024-02-27 DIAGNOSIS — I48.0 PAF (PAROXYSMAL ATRIAL FIBRILLATION): Primary | ICD-10-CM

## 2024-02-27 PROCEDURE — 3008F BODY MASS INDEX DOCD: CPT | Mod: CPTII,S$GLB,, | Performed by: INTERNAL MEDICINE

## 2024-02-27 PROCEDURE — 3077F SYST BP >= 140 MM HG: CPT | Mod: CPTII,S$GLB,, | Performed by: INTERNAL MEDICINE

## 2024-02-27 PROCEDURE — 99214 OFFICE O/P EST MOD 30 MIN: CPT | Mod: S$GLB,,, | Performed by: INTERNAL MEDICINE

## 2024-02-27 PROCEDURE — 1159F MED LIST DOCD IN RCRD: CPT | Mod: CPTII,S$GLB,, | Performed by: INTERNAL MEDICINE

## 2024-02-27 PROCEDURE — 3080F DIAST BP >= 90 MM HG: CPT | Mod: CPTII,S$GLB,, | Performed by: INTERNAL MEDICINE

## 2024-02-27 PROCEDURE — 99999 PR PBB SHADOW E&M-EST. PATIENT-LVL III: CPT | Mod: PBBFAC,,, | Performed by: INTERNAL MEDICINE

## 2024-02-27 PROCEDURE — 1160F RVW MEDS BY RX/DR IN RCRD: CPT | Mod: CPTII,S$GLB,, | Performed by: INTERNAL MEDICINE

## 2024-02-27 NOTE — PROGRESS NOTES
Subjective:    Patient ID:  Javid Tai is a 53 y.o. male patient here for evaluation Follow-up      History of Present Illness:  Cardiology follow-up.  Very remote history of paroxysmal atrial fibrillation, corrected with diet and lifestyle modification.  Follow-up Holter monitor and stress remarkable for 3 to Wenckebach periods, benign ventricular and atrial ectopy, stress echo negative for ischemia.    Repeat blood pressure by me today 160 over 80.      Exercises regularly without significant complaints.        Review of patient's allergies indicates:   Allergen Reactions    Gluten protein Other (See Comments) and Shortness Of Breath       Past Medical History:   Diagnosis Date    Non-celiac gluten sensitivity     Paroxysmal A-fib     primarily with alcohol trigger    Primary hypertension 1/8/2024    Shingles      Past Surgical History:   Procedure Laterality Date    WISDOM TOOTH EXTRACTION       Social History     Tobacco Use    Smoking status: Never    Smokeless tobacco: Former   Substance Use Topics    Alcohol use: Yes    Drug use: No        Review of Systems:    As noted in HPI in addition      REVIEW OF SYSTEMS  Review of Systems   Constitutional: Negative for decreased appetite, diaphoresis, night sweats, weight gain and weight loss.   HENT:  Negative for nosebleeds and odynophagia.    Eyes:  Negative for double vision and photophobia.   Cardiovascular:  Negative for chest pain, claudication, cyanosis, dyspnea on exertion, irregular heartbeat, leg swelling, near-syncope, orthopnea, palpitations, paroxysmal nocturnal dyspnea and syncope.   Respiratory:  Negative for cough, hemoptysis, shortness of breath and wheezing.    Hematologic/Lymphatic: Negative for adenopathy.   Skin:  Negative for flushing, skin cancer and suspicious lesions.   Musculoskeletal:  Negative for gout, myalgias and neck pain.   Gastrointestinal:  Negative for abdominal pain, heartburn, hematemesis and hematochezia.   Genitourinary:   Negative for bladder incontinence, hesitancy and nocturia.   Neurological:  Negative for focal weakness, headaches, light-headedness and paresthesias.   Psychiatric/Behavioral:  Negative for memory loss and substance abuse.               Objective:        Vitals:    02/27/24 1424   BP: (!) 170/98   Pulse: (!) 57       Lab Results   Component Value Date    WBC 4.52 08/09/2023    HGB 14.2 08/09/2023    HCT 43.1 08/09/2023     08/09/2023    CHOL 196 08/09/2023    TRIG 62 08/09/2023    HDL 62 08/09/2023    ALT 32 08/09/2023    AST 35 08/09/2023     08/09/2023    K 4.7 08/09/2023     08/09/2023    CREATININE 1.2 08/09/2023    BUN 23 (H) 08/09/2023    CO2 28 08/09/2023    PSA 0.69 08/09/2023        ECHOCARDIOGRAM RESULTS  Results for orders placed in visit on 01/09/24    Stress Echo Which stress agent will be used? Treadmill Exercise; Color Flow Doppler? No    Interpretation Summary    Left Ventricle: The left ventricle is normal in size. Normal wall thickness. Normal wall motion. There is normal systolic function with a visually estimated ejection fraction of 60 - 65%. There is normal diastolic function.    Right Ventricle: Normal right ventricular cavity size. Wall thickness is normal. Right ventricle wall motion  is normal. Systolic function is normal.    Stress Protocol: The patient exercised for 10 minutes 30 seconds on a high ramp protocol, corresponding to a functional capacity of 19.0 METS, achieving a peak heart rate of 164 bpm, which is 97 % of the age predicted maximum heart rate. The patient reported no symptoms during the stress test. The test was stopped because the patient experienced fatigue.    Baseline ECG: The Baseline ECG reveals sinus rhythm. The ST segments are normal.    Stress ECG: There are no ST segment deviation identified during the protocol.    ECG Conclusion: The ECG portion of the study is negative for ischemia.    Post-stress        CURRENT/PREVIOUS VISIT EKG  Results for  orders placed or performed in visit on 01/08/24   IN OFFICE EKG 12-LEAD (to [place])    Collection Time: 01/08/24  9:32 AM    Narrative    Test Reason : I48.0,I10,    Vent. Rate : 068 BPM     Atrial Rate : 068 BPM     P-R Int : 228 ms          QRS Dur : 092 ms      QT Int : 398 ms       P-R-T Axes : 059 077 032 degrees     QTc Int : 423 ms    Sinus rhythm with 1st degree A-V block with occasional Premature  ventricular complexes  Nonspecific ST abnormality  Abnormal ECG  No previous ECGs available  Confirmed by Fausto Ward MD ([id number]) on 1/8/2024 12:33:59 PM    Referred By: DAVID   SELF           Confirmed By:Fausto Ward MD     No valid procedures specified.   No results found for this or any previous visit.    No valid procedures specified.    PHYSICAL EXAM  CONSTITUTIONAL: Well built, well nourished in no apparent distress  NECK: no carotid bruit, no JVD  LUNGS: CTA  CHEST WALL: no tenderness,  HEART: regular rate and rhythm, S1, S2 normal, no murmur, click, rub or gallop   ABDOMEN: soft, non-tender; bowel sounds normal; no masses,  no organomegaly  EXTREMITIES: Extremities normal, no edema, no calf tenderness noted  NEURO: AAO X 3    I HAVE REVIEWED :    The vital signs, nurses notes, and all the pertinent radiology and labs.         No current outpatient medications       Assessment:   Remote history of paroxysmal atrial fibrillation, non recurrent.    Unremarkable Holter monitor and stress echo        Plan:     Monitor blood pressures at home.  If remain elevated call.  Labs follow-up primary care.  Six-month follow up with Cardiology.        No follow-ups on file.

## 2024-07-24 ENCOUNTER — OFFICE VISIT (OUTPATIENT)
Dept: FAMILY MEDICINE | Facility: CLINIC | Age: 54
End: 2024-07-24
Payer: COMMERCIAL

## 2024-07-24 VITALS
RESPIRATION RATE: 18 BRPM | WEIGHT: 187.19 LBS | BODY MASS INDEX: 24.81 KG/M2 | HEIGHT: 73 IN | DIASTOLIC BLOOD PRESSURE: 74 MMHG | OXYGEN SATURATION: 99 % | HEART RATE: 71 BPM | SYSTOLIC BLOOD PRESSURE: 128 MMHG

## 2024-07-24 DIAGNOSIS — Z00.00 PREVENTATIVE HEALTH CARE: Primary | ICD-10-CM

## 2024-07-24 PROCEDURE — 99999 PR PBB SHADOW E&M-EST. PATIENT-LVL III: CPT | Mod: PBBFAC,,, | Performed by: FAMILY MEDICINE

## 2024-07-24 PROCEDURE — 99396 PREV VISIT EST AGE 40-64: CPT | Mod: S$GLB,,, | Performed by: FAMILY MEDICINE

## 2024-07-24 PROCEDURE — 3078F DIAST BP <80 MM HG: CPT | Mod: CPTII,S$GLB,, | Performed by: FAMILY MEDICINE

## 2024-07-24 PROCEDURE — 3074F SYST BP LT 130 MM HG: CPT | Mod: CPTII,S$GLB,, | Performed by: FAMILY MEDICINE

## 2024-07-24 PROCEDURE — 1160F RVW MEDS BY RX/DR IN RCRD: CPT | Mod: CPTII,S$GLB,, | Performed by: FAMILY MEDICINE

## 2024-07-24 PROCEDURE — 1159F MED LIST DOCD IN RCRD: CPT | Mod: CPTII,S$GLB,, | Performed by: FAMILY MEDICINE

## 2024-07-24 PROCEDURE — 3008F BODY MASS INDEX DOCD: CPT | Mod: CPTII,S$GLB,, | Performed by: FAMILY MEDICINE

## 2024-07-24 NOTE — PROGRESS NOTES
Subjective:       Patient ID: Javid Tai is a 53 y.o. male.    Chief Complaint: Preventative Health Care (Physical)    Here tfor general exam    Palpitations, skipping beats, PVCs - Previous cardiac exam in 2013 was normal, including holter.recent workup showed PACs and PVCs  H/o elevated blood pressure - following low-salt diet. This manages this well.  Asthma - C/o some chest tightness at night that are relieved with relaxation techniques. No exercise symptoms. He did not respond to albuterol or inhaled steroids.    Enjoys tennis.    Taking calicium + D, magnesium and B12.        Past Medical History:   Diagnosis Date    Non-celiac gluten sensitivity     Paroxysmal A-fib     primarily with alcohol trigger    Primary hypertension 1/8/2024    Shingles        Past Surgical History:   Procedure Laterality Date    WISDOM TOOTH EXTRACTION         Review of patient's allergies indicates:   Allergen Reactions    Gluten protein Other (See Comments) and Shortness Of Breath       Social History     Socioeconomic History    Marital status:     Number of children: 1   Occupational History    Occupation:    Tobacco Use    Smoking status: Never    Smokeless tobacco: Former   Substance and Sexual Activity    Alcohol use: Yes    Drug use: No   Social History Narrative    Enjoys tennis     Social Determinants of Health     Financial Resource Strain: Low Risk  (1/7/2024)    Overall Financial Resource Strain (CARDIA)     Difficulty of Paying Living Expenses: Not hard at all   Food Insecurity: No Food Insecurity (1/7/2024)    Hunger Vital Sign     Worried About Running Out of Food in the Last Year: Never true     Ran Out of Food in the Last Year: Never true   Transportation Needs: No Transportation Needs (1/7/2024)    PRAPARE - Transportation     Lack of Transportation (Medical): No     Lack of Transportation (Non-Medical): No   Physical Activity: Sufficiently Active (1/7/2024)    Exercise Vital Sign     Days of  "Exercise per Week: 7 days     Minutes of Exercise per Session: 90 min   Stress: No Stress Concern Present (1/7/2024)    [employer] - Occupational Stress Questionnaire     Feeling of Stress : Only a little   Housing Stability: Low Risk  (1/7/2024)    Housing Stability Vital Sign     Unable to Pay for Housing in the Last Year: No     Number of Places Lived in the Last Year: 1     Unstable Housing in the Last Year: No       No current outpatient medications on file prior to visit.     No current facility-administered medications on file prior to visit.       Family History   Problem Relation Name Age of Onset    Hypertension Mother      Fibromyalgia Mother      Amblyopia Neg Hx      Blindness Neg Hx      Cancer Neg Hx      Cataracts Neg Hx      Diabetes Neg Hx      Glaucoma Neg Hx      Macular degeneration Neg Hx      Retinal detachment Neg Hx      Strabismus Neg Hx      Stroke Neg Hx      Thyroid disease Neg Hx         Review of Systems   Constitutional:  Negative for appetite change, chills, fever and unexpected weight change.   HENT:  Negative for sore throat and trouble swallowing.    Eyes:  Negative for pain and visual disturbance.   Respiratory:  Positive for chest tightness. Negative for cough, shortness of breath and wheezing.    Cardiovascular:  Negative for chest pain, palpitations and leg swelling.   Gastrointestinal:  Negative for abdominal pain, blood in stool, constipation, diarrhea and nausea.   Genitourinary:  Negative for difficulty urinating, dysuria and hematuria.   Musculoskeletal:  Negative for arthralgias, gait problem and neck pain.   Skin:  Negative for rash and wound.   Neurological:  Negative for dizziness, weakness, light-headedness and headaches.   Hematological:  Negative for adenopathy.   Psychiatric/Behavioral:  Negative for dysphoric mood.        Objective:      /74   Pulse 71   Resp 18   Ht 6' 1" (1.854 m)   Wt 84.9 kg (187 lb 2.7 oz)   SpO2 99%  "  BMI 24.69 kg/m²   Physical Exam  Constitutional:       General: He is not in acute distress.     Appearance: He is well-developed.   HENT:      Head: Normocephalic and atraumatic.      Right Ear: External ear normal.      Left Ear: External ear normal.      Mouth/Throat:      Pharynx: Uvula midline. No oropharyngeal exudate.   Eyes:      General: Lids are normal.      Conjunctiva/sclera: Conjunctivae normal.      Pupils: Pupils are equal, round, and reactive to light.   Neck:      Thyroid: No thyroid mass or thyromegaly.      Trachea: Phonation normal.   Cardiovascular:      Rate and Rhythm: Normal rate and regular rhythm.      Heart sounds: Normal heart sounds. No murmur heard.     No friction rub. No gallop.   Pulmonary:      Effort: Pulmonary effort is normal. No respiratory distress.      Breath sounds: Normal breath sounds. No wheezing or rales.   Musculoskeletal:         General: Normal range of motion.      Cervical back: Full passive range of motion without pain, normal range of motion and neck supple.   Lymphadenopathy:      Cervical: No cervical adenopathy.   Skin:     General: Skin is warm and dry.   Neurological:      Mental Status: He is alert and oriented to person, place, and time.      Cranial Nerves: No cranial nerve deficit.      Coordination: Coordination normal.   Psychiatric:         Speech: Speech normal.         Behavior: Behavior normal.         Thought Content: Thought content normal.         Judgment: Judgment normal.         Assessment:       1. Preventative health care          Plan:       Preventative health care  -     CBC Auto Differential; Future; Expected date: 07/24/2024  -     Comprehensive Metabolic Panel; Future; Expected date: 07/24/2024  -     Lipid Panel; Future; Expected date: 07/24/2024  -     PSA, Screening; Future; Expected date: 07/24/2024        Overall appears healthy  Discussed colonoscopy  Counseled on regular exercise, maintenance of a healthy weight, balanced diet  rich in fruits/vegetables and lean protein, and avoidance of unhealthy habits like smoking and excessive alcohol intake.

## 2024-07-29 ENCOUNTER — PATIENT MESSAGE (OUTPATIENT)
Dept: FAMILY MEDICINE | Facility: CLINIC | Age: 54
End: 2024-07-29
Payer: COMMERCIAL

## 2024-07-29 ENCOUNTER — LAB VISIT (OUTPATIENT)
Dept: LAB | Facility: HOSPITAL | Age: 54
End: 2024-07-29
Attending: FAMILY MEDICINE
Payer: COMMERCIAL

## 2024-07-29 DIAGNOSIS — Z00.00 PREVENTATIVE HEALTH CARE: ICD-10-CM

## 2024-07-29 LAB
ALBUMIN SERPL BCP-MCNC: 4.1 G/DL (ref 3.5–5.2)
ALP SERPL-CCNC: 53 U/L (ref 55–135)
ALT SERPL W/O P-5'-P-CCNC: 19 U/L (ref 10–44)
ANION GAP SERPL CALC-SCNC: 8 MMOL/L (ref 8–16)
AST SERPL-CCNC: 22 U/L (ref 10–40)
BASOPHILS # BLD AUTO: 0.05 K/UL (ref 0–0.2)
BASOPHILS NFR BLD: 1 % (ref 0–1.9)
BILIRUB SERPL-MCNC: 0.7 MG/DL (ref 0.1–1)
BUN SERPL-MCNC: 29 MG/DL (ref 6–20)
CALCIUM SERPL-MCNC: 10.1 MG/DL (ref 8.7–10.5)
CHLORIDE SERPL-SCNC: 104 MMOL/L (ref 95–110)
CHOLEST SERPL-MCNC: 193 MG/DL (ref 120–199)
CHOLEST/HDLC SERPL: 2.7 {RATIO} (ref 2–5)
CO2 SERPL-SCNC: 27 MMOL/L (ref 23–29)
COMPLEXED PSA SERPL-MCNC: 0.77 NG/ML (ref 0–4)
CREAT SERPL-MCNC: 1.4 MG/DL (ref 0.5–1.4)
DIFFERENTIAL METHOD BLD: NORMAL
EOSINOPHIL # BLD AUTO: 0.2 K/UL (ref 0–0.5)
EOSINOPHIL NFR BLD: 2.9 % (ref 0–8)
ERYTHROCYTE [DISTWIDTH] IN BLOOD BY AUTOMATED COUNT: 13.2 % (ref 11.5–14.5)
EST. GFR  (NO RACE VARIABLE): >60 ML/MIN/1.73 M^2
GLUCOSE SERPL-MCNC: 99 MG/DL (ref 70–110)
HCT VFR BLD AUTO: 43.5 % (ref 40–54)
HDLC SERPL-MCNC: 72 MG/DL (ref 40–75)
HDLC SERPL: 37.3 % (ref 20–50)
HGB BLD-MCNC: 14.1 G/DL (ref 14–18)
IMM GRANULOCYTES # BLD AUTO: 0.02 K/UL (ref 0–0.04)
IMM GRANULOCYTES NFR BLD AUTO: 0.4 % (ref 0–0.5)
LDLC SERPL CALC-MCNC: 107.8 MG/DL (ref 63–159)
LYMPHOCYTES # BLD AUTO: 1.3 K/UL (ref 1–4.8)
LYMPHOCYTES NFR BLD: 26 % (ref 18–48)
MCH RBC QN AUTO: 30.7 PG (ref 27–31)
MCHC RBC AUTO-ENTMCNC: 32.4 G/DL (ref 32–36)
MCV RBC AUTO: 95 FL (ref 82–98)
MONOCYTES # BLD AUTO: 0.4 K/UL (ref 0.3–1)
MONOCYTES NFR BLD: 8.5 % (ref 4–15)
NEUTROPHILS # BLD AUTO: 3.2 K/UL (ref 1.8–7.7)
NEUTROPHILS NFR BLD: 61.2 % (ref 38–73)
NONHDLC SERPL-MCNC: 121 MG/DL
NRBC BLD-RTO: 0 /100 WBC
PLATELET # BLD AUTO: 197 K/UL (ref 150–450)
PMV BLD AUTO: 12.3 FL (ref 9.2–12.9)
POTASSIUM SERPL-SCNC: 4.7 MMOL/L (ref 3.5–5.1)
PROT SERPL-MCNC: 7.2 G/DL (ref 6–8.4)
RBC # BLD AUTO: 4.6 M/UL (ref 4.6–6.2)
SODIUM SERPL-SCNC: 139 MMOL/L (ref 136–145)
TRIGL SERPL-MCNC: 66 MG/DL (ref 30–150)
WBC # BLD AUTO: 5.16 K/UL (ref 3.9–12.7)

## 2024-07-29 PROCEDURE — 84153 ASSAY OF PSA TOTAL: CPT | Performed by: FAMILY MEDICINE

## 2024-07-29 PROCEDURE — 80053 COMPREHEN METABOLIC PANEL: CPT | Performed by: FAMILY MEDICINE

## 2024-07-29 PROCEDURE — 80061 LIPID PANEL: CPT | Performed by: FAMILY MEDICINE

## 2024-07-29 PROCEDURE — 85025 COMPLETE CBC W/AUTO DIFF WBC: CPT | Performed by: FAMILY MEDICINE

## 2024-07-29 PROCEDURE — 36415 COLL VENOUS BLD VENIPUNCTURE: CPT | Mod: PO | Performed by: FAMILY MEDICINE

## 2024-08-15 ENCOUNTER — TELEPHONE (OUTPATIENT)
Dept: FAMILY MEDICINE | Facility: CLINIC | Age: 54
End: 2024-08-15
Payer: COMMERCIAL

## 2024-08-15 NOTE — TELEPHONE ENCOUNTER
----- Message from Eb Obrien MD sent at 8/15/2024  6:57 AM CDT -----  Please inform patient that labs were normal.

## 2024-08-30 ENCOUNTER — TELEPHONE (OUTPATIENT)
Dept: CARDIOLOGY | Facility: CLINIC | Age: 54
End: 2024-08-30
Payer: COMMERCIAL

## 2024-09-03 ENCOUNTER — OFFICE VISIT (OUTPATIENT)
Dept: CARDIOLOGY | Facility: CLINIC | Age: 54
End: 2024-09-03
Payer: COMMERCIAL

## 2024-09-03 VITALS
DIASTOLIC BLOOD PRESSURE: 86 MMHG | SYSTOLIC BLOOD PRESSURE: 144 MMHG | WEIGHT: 187.38 LBS | HEART RATE: 66 BPM | HEIGHT: 73 IN | BODY MASS INDEX: 24.83 KG/M2

## 2024-09-03 DIAGNOSIS — I10 PRIMARY HYPERTENSION: ICD-10-CM

## 2024-09-03 DIAGNOSIS — I48.0 PAF (PAROXYSMAL ATRIAL FIBRILLATION): Primary | ICD-10-CM

## 2024-09-03 PROCEDURE — 3008F BODY MASS INDEX DOCD: CPT | Mod: CPTII,S$GLB,, | Performed by: INTERNAL MEDICINE

## 2024-09-03 PROCEDURE — 3077F SYST BP >= 140 MM HG: CPT | Mod: CPTII,S$GLB,, | Performed by: INTERNAL MEDICINE

## 2024-09-03 PROCEDURE — 99214 OFFICE O/P EST MOD 30 MIN: CPT | Mod: S$GLB,,, | Performed by: INTERNAL MEDICINE

## 2024-09-03 PROCEDURE — 99999 PR PBB SHADOW E&M-EST. PATIENT-LVL III: CPT | Mod: PBBFAC,,, | Performed by: INTERNAL MEDICINE

## 2024-09-03 PROCEDURE — 3079F DIAST BP 80-89 MM HG: CPT | Mod: CPTII,S$GLB,, | Performed by: INTERNAL MEDICINE

## 2024-09-03 PROCEDURE — 1159F MED LIST DOCD IN RCRD: CPT | Mod: CPTII,S$GLB,, | Performed by: INTERNAL MEDICINE

## 2024-09-03 RX ORDER — METOPROLOL TARTRATE 25 MG/1
12.5 TABLET, FILM COATED ORAL DAILY
Qty: 45 TABLET | Refills: 3 | Status: SHIPPED | OUTPATIENT
Start: 2024-09-03 | End: 2025-09-03

## 2024-09-03 NOTE — PROGRESS NOTES
Subjective:    Patient ID:  Javid Tai is a 54 y.o. male patient here for evaluation Follow-up and Hypertension      History of Present Illness:  Follow-up.  Remote history of AFib resolved.  Non recurrent with lifestyle modification.  Borderline blood pressure readings.    Workup in the past with stress echo 01/2024.  Holter monitor with benign ectopy, 3/2 Wenckebach.  First-degree AV block.  Asymptomatic.  No high-grade SA jennifer block.             Review of patient's allergies indicates:   Allergen Reactions    Gluten protein Other (See Comments) and Shortness Of Breath       Past Medical History:   Diagnosis Date    Non-celiac gluten sensitivity     Paroxysmal A-fib     primarily with alcohol trigger    Primary hypertension 1/8/2024    Shingles      Past Surgical History:   Procedure Laterality Date    WISDOM TOOTH EXTRACTION       Social History     Tobacco Use    Smoking status: Never    Smokeless tobacco: Former   Substance Use Topics    Alcohol use: Yes    Drug use: No        Review of Systems:    As noted in HPI in addition      REVIEW OF SYSTEMS  Review of Systems   Constitutional: Negative for decreased appetite, diaphoresis, night sweats, weight gain and weight loss.   HENT:  Negative for nosebleeds and odynophagia.    Eyes:  Negative for double vision and photophobia.   Cardiovascular:  Negative for chest pain, claudication, cyanosis, dyspnea on exertion, irregular heartbeat, leg swelling, near-syncope, orthopnea, palpitations, paroxysmal nocturnal dyspnea and syncope.   Respiratory:  Negative for cough, hemoptysis, shortness of breath and wheezing.    Hematologic/Lymphatic: Negative for adenopathy.   Skin:  Negative for flushing, skin cancer and suspicious lesions.   Musculoskeletal:  Negative for gout, myalgias and neck pain.   Gastrointestinal:  Negative for abdominal pain, heartburn, hematemesis and hematochezia.   Genitourinary:  Negative for bladder incontinence, hesitancy and nocturia.    Neurological:  Negative for focal weakness, headaches, light-headedness and paresthesias.   Psychiatric/Behavioral:  Negative for memory loss and substance abuse.               Objective:        Vitals:    09/03/24 1433   BP: (!) 144/86   Pulse: 66       Lab Results   Component Value Date    WBC 5.16 07/29/2024    HGB 14.1 07/29/2024    HCT 43.5 07/29/2024     07/29/2024    CHOL 193 07/29/2024    TRIG 66 07/29/2024    HDL 72 07/29/2024    ALT 19 07/29/2024    AST 22 07/29/2024     07/29/2024    K 4.7 07/29/2024     07/29/2024    CREATININE 1.4 07/29/2024    BUN 29 (H) 07/29/2024    CO2 27 07/29/2024    PSA 0.77 07/29/2024        ECHOCARDIOGRAM RESULTS  Results for orders placed in visit on 01/09/24    Stress Echo Which stress agent will be used? Treadmill Exercise; Color Flow Doppler? No    Interpretation Summary    Left Ventricle: The left ventricle is normal in size. Normal wall thickness. Normal wall motion. There is normal systolic function with a visually estimated ejection fraction of 60 - 65%. There is normal diastolic function.    Right Ventricle: Normal right ventricular cavity size. Wall thickness is normal. Right ventricle wall motion  is normal. Systolic function is normal.    Stress Protocol: The patient exercised for 10 minutes 30 seconds on a high ramp protocol, corresponding to a functional capacity of 19.0 METS, achieving a peak heart rate of 164 bpm, which is 97 % of the age predicted maximum heart rate. The patient reported no symptoms during the stress test. The test was stopped because the patient experienced fatigue.    Baseline ECG: The Baseline ECG reveals sinus rhythm. The ST segments are normal.    Stress ECG: There are no ST segment deviation identified during the protocol.    ECG Conclusion: The ECG portion of the study is negative for ischemia.    Post-stress    No results found for this or any previous visit.          CURRENT/PREVIOUS VISIT EKG  Results for orders  placed or performed in visit on 01/08/24   IN OFFICE EKG 12-LEAD (to [place])    Collection Time: 01/08/24  9:32 AM    Narrative    Test Reason : I48.0,I10,    Vent. Rate : 068 BPM     Atrial Rate : 068 BPM     P-R Int : 228 ms          QRS Dur : 092 ms      QT Int : 398 ms       P-R-T Axes : 059 077 032 degrees     QTc Int : 423 ms    Sinus rhythm with 1st degree A-V block with occasional Premature  ventricular complexes  Nonspecific ST abnormality  Abnormal ECG  No previous ECGs available  Confirmed by Fausto Ward MD ([id number]) on 1/8/2024 12:33:59 PM    Referred By: DAVID   SELF           Confirmed By:Fausto Ward MD     No valid procedures specified.   No results found for this or any previous visit.    No valid procedures specified.    PHYSICAL EXAM  GENERAL: well built, well nourished, well-developed in no apparent distress alert and oriented.   HEENT: Normocephalic. Pupils normal and conjunctivae normal.  Mucous membranes normal, no cyanosis or icterus, trachea central,no pallor or icterus is noted..   NECK: No JVD. No bruit..   THYROID: Thyroid not enlarged. No nodules present..   CARDIAC:  Normal S1-S2.  No murmur rub click or gallop.  PMI nondisplaced.  CHEST ANATOMY: normal.   LUNGS: Clear to auscultation. No wheezing or rhonchi..   ABDOMEN: Soft no masses or organomegaly.  No abdomen pulsations or bruits.  Normal bowel sounds. No pulsations and no masses felt, No guarding or rebound.   URINARY: No gonzalez catheter   EXTREMITIES: No cyanosis, clubbing or edema noted at this time., no calf tenderness bilaterally.   PERIPHERAL VASCULAR SYSTEM: Good palpable distal pulses.  2+ femoral, popliteal and pedal pulses.  No bruits    CENTRAL NERVOUS SYSTEM: No focal motor or sensory deficits noted.   SKIN: Skin without lesions, moist, well perfused.   MUSCLE STRENGTH & TONE: No noteable weakness, atrophy or abnormal movement    I HAVE REVIEWED :    The vital signs, nurses notes, and all the pertinent radiology and labs.          No current outpatient medications       Assessment:   Remote history of atrial fibrillation resolved.  Better with lifestyle modification.  Continued borderline blood pressure readings.  Negative stress echo 1/ 2024 with unremarkable Holter monitor revealing first-degree AV block, periods of 3/2 Wenckebach. Nonsustained SVT.  No high-grade SA AV jennifer block.        Plan:     Continue to monitor clinically.  Risk factor modification.  OKAY TO USE P.R.N. BETA-BLOCKERS.    6m    No follow-ups on file.

## 2025-05-12 ENCOUNTER — OFFICE VISIT (OUTPATIENT)
Dept: OPTOMETRY | Facility: CLINIC | Age: 55
End: 2025-05-12
Payer: COMMERCIAL

## 2025-05-12 DIAGNOSIS — Z13.5 GLAUCOMA SCREENING: ICD-10-CM

## 2025-05-12 DIAGNOSIS — H43.393 VITREOUS FLOATERS, BILATERAL: ICD-10-CM

## 2025-05-12 DIAGNOSIS — H52.13 MYOPIA OF BOTH EYES WITH ASTIGMATISM AND PRESBYOPIA: ICD-10-CM

## 2025-05-12 DIAGNOSIS — H52.4 MYOPIA OF BOTH EYES WITH ASTIGMATISM AND PRESBYOPIA: ICD-10-CM

## 2025-05-12 DIAGNOSIS — H52.203 MYOPIA OF BOTH EYES WITH ASTIGMATISM AND PRESBYOPIA: ICD-10-CM

## 2025-05-12 DIAGNOSIS — Z01.00 EXAMINATION OF EYES AND VISION: Primary | ICD-10-CM

## 2025-05-12 PROBLEM — Z97.3 WEARS CONTACT LENSES: Status: RESOLVED | Noted: 2024-01-18 | Resolved: 2025-05-12

## 2025-05-12 PROCEDURE — 92015 DETERMINE REFRACTIVE STATE: CPT | Mod: S$GLB,,, | Performed by: OPTOMETRIST

## 2025-05-12 PROCEDURE — 1159F MED LIST DOCD IN RCRD: CPT | Mod: CPTII,S$GLB,, | Performed by: OPTOMETRIST

## 2025-05-12 PROCEDURE — 92014 COMPRE OPH EXAM EST PT 1/>: CPT | Mod: S$GLB,,, | Performed by: OPTOMETRIST

## 2025-05-12 PROCEDURE — 99999 PR PBB SHADOW E&M-EST. PATIENT-LVL II: CPT | Mod: PBBFAC,,, | Performed by: OPTOMETRIST

## 2025-05-12 NOTE — PROGRESS NOTES
HPI    Routine eye exam-dle-1/24    Pt denies any changes since last visit. Needing updated glasses rx. No   contacts-didn't work out. Denies any flashes or floaters. No gtts.   Last edited by Ca Cowan on 5/12/2025  9:31 AM.            Assessment /Plan     For exam results, see Encounter Report.    Examination of eyes and vision    Vitreous floaters, bilateral    Glaucoma screening    Myopia of both eyes with astigmatism and presbyopia      Ocular health exam OU   Mild OU, RD precautions given and reviewed. Patient knows to call/ message if any further changes in symptoms occur.  Not suspect, low c/d, crowded disc   Updated specs rx gave copy for distance   Demo in phoropter --minimal changes     Had given up cl's due to no near vision w/ lenses in place   Discussed possible lasik or clear lens x in future     Discussed and educated patient on current findings /plan.  RTC 1 year, prn if any changes / issues

## 2025-05-12 NOTE — PATIENT INSTRUCTIONS
"DRY EYES -- BURNING OR MAURA SYMPTOMS:  Use Over The Counter artificial tears as needed for dry eye symptoms.   Some common brands include:  Systane, Optive, Refresh, and Thera-Tears.  These drops can be used as frequently as desired, but may be most helpful use during long periods of concentrated work.  For example, reading / working at the computer. Start with 3-4x per day.     Nighttime Ophthalmic gel or ointments are available: Refresh PM, Genteal, and Lacrilube.    Avoid drops that "get redness out" (Visine, Murine, Clear Eyes), as these may contain medication that could further irritate the eyes, especially with chronic use.    ALLERGY EYES -- ITCHING SYMPTOMS:  Over the counter medications include--Pataday, Zaditor, and Alaway.  Use as directed 1-2 drops daily for symptoms of itching / watering eyes.  These drops will not help for dry eye or exposure symptoms.    REDNESS RELIEF:  Lumify---is a good redness reliever that will not cause irritation if used chronically.        FLASHES / FLOATERS / POSTERIOR VITREOUS DETACHMENT    Call the clinic if you have any further changes in symptoms.  Including:  Increased numbers of floaters or flashing lights, dimness or darkness that moves through or stays constant in your vision, or any pain in the eye (s).    You may sometimes see small specks or clouds moving in your field of vision.  They are called FLOATERS.  You can often see them when looking at a plain background, like a blank wall or blue reese.  Floaters are actually tiny clumps of gel or cells inside the VITREOUS, the clear jelly-like fluid that fills the inside of your eye.    While these objects look like they are in front of your eye, they are actually floating inside.  What you see are the shadows they cast on the RETINA, the nerve layer at the back of the eye that senses light and allows you to see.      POSTERIOR VITREOUS DETACHMENT    The appearance of new floaters may be alarming.  If you suddenly " develop new floaters, you should contact your eye care professional  right away.    The retina can tear if the shrinking vitreous pulls away from the wall of the eye.  This sometimes causes a small amount of bleeding in the eye that may appear as new floaters.    A torn retina is always a serious problem, since it can lead to a retinal detachment.  You should see your eye care professional as soon as possible if:    even one new floater appears suddenly;  you see sudden flashes of light;  you notice other symptoms, like the loss of side vision, or a curtain closes down in your vision        POSTERIOR VITREOUS DETACHMENT is more common for people who:    are nearsighted;  have had cataract surgery;  have had YAG laser surgery of the eye;  have had inflammation inside the eye;  are over age 60.      While some floaters may remain visible, many of them will fade over time and become less noticeable.  Even if you've had some floaters for years, you should have your eyes checked as soon as possible if you notice new ones.    FLASHING LIGHTS    When the vitreous gel rubs or pulls on the retina, you may see what look like flashing lights or lightning streaks.  These flashes can appear off and on for several weeks or months.      Some people experience flashes of light that appear as jagged lines or heat waves in both eyes, lasting 10-20 minutes.  These flashes are caused by a spasm of blood vessels in the brain, which is called a migraine.    If a headache follows these flashes, it's called a migraine headache.  If   no headache occurs, these flashes are called Ophthalmic or Ocular Migraine.

## 2025-08-14 ENCOUNTER — PATIENT MESSAGE (OUTPATIENT)
Dept: ADMINISTRATIVE | Facility: HOSPITAL | Age: 55
End: 2025-08-14
Payer: COMMERCIAL

## 2025-08-14 ENCOUNTER — PATIENT OUTREACH (OUTPATIENT)
Dept: ADMINISTRATIVE | Facility: HOSPITAL | Age: 55
End: 2025-08-14
Payer: COMMERCIAL

## 2025-08-14 DIAGNOSIS — Z12.11 ENCOUNTER FOR COLORECTAL CANCER SCREENING: Primary | ICD-10-CM

## 2025-08-14 DIAGNOSIS — Z12.12 ENCOUNTER FOR COLORECTAL CANCER SCREENING: Primary | ICD-10-CM

## 2025-08-15 ENCOUNTER — OFFICE VISIT (OUTPATIENT)
Facility: CLINIC | Age: 55
End: 2025-08-15
Payer: COMMERCIAL

## 2025-08-15 VITALS
WEIGHT: 187.81 LBS | SYSTOLIC BLOOD PRESSURE: 153 MMHG | DIASTOLIC BLOOD PRESSURE: 98 MMHG | BODY MASS INDEX: 24.89 KG/M2 | HEIGHT: 73 IN | HEART RATE: 62 BPM

## 2025-08-15 DIAGNOSIS — I48.0 PAF (PAROXYSMAL ATRIAL FIBRILLATION): ICD-10-CM

## 2025-08-15 DIAGNOSIS — I10 PRIMARY HYPERTENSION: Primary | ICD-10-CM

## 2025-09-02 ENCOUNTER — TELEPHONE (OUTPATIENT)
Dept: GASTROENTEROLOGY | Facility: CLINIC | Age: 55
End: 2025-09-02
Payer: COMMERCIAL

## 2025-09-03 PROBLEM — F41.9 ANXIETY: Status: ACTIVE | Noted: 2025-09-03
